# Patient Record
Sex: FEMALE | Race: BLACK OR AFRICAN AMERICAN | NOT HISPANIC OR LATINO | Employment: UNEMPLOYED | URBAN - METROPOLITAN AREA
[De-identification: names, ages, dates, MRNs, and addresses within clinical notes are randomized per-mention and may not be internally consistent; named-entity substitution may affect disease eponyms.]

---

## 2019-07-02 PROBLEM — E11.9 DIABETES MELLITUS WITHOUT COMPLICATION: Status: ACTIVE | Noted: 2019-07-02

## 2019-07-02 PROBLEM — H40.009 GLAUCOMA SUSPECT: Status: ACTIVE | Noted: 2019-07-02

## 2020-02-27 PROBLEM — D50.9 IRON DEFICIENCY ANEMIA: Status: ACTIVE | Noted: 2020-02-27

## 2020-02-27 PROBLEM — F41.9 ANXIETY: Status: ACTIVE | Noted: 2020-02-27

## 2020-02-27 PROBLEM — J30.89 ENVIRONMENTAL AND SEASONAL ALLERGIES: Status: ACTIVE | Noted: 2020-02-27

## 2020-02-27 PROBLEM — K21.9 GASTROESOPHAGEAL REFLUX DISEASE WITHOUT ESOPHAGITIS: Status: ACTIVE | Noted: 2020-02-27

## 2020-02-27 PROBLEM — Z72.0 TOBACCO USE: Status: ACTIVE | Noted: 2020-02-27

## 2020-02-27 PROBLEM — H53.8 BLURRY VISION, BILATERAL: Status: ACTIVE | Noted: 2020-02-27

## 2020-02-27 PROBLEM — G47.33 OSA (OBSTRUCTIVE SLEEP APNEA): Status: ACTIVE | Noted: 2020-02-27

## 2020-02-27 PROBLEM — N93.8 DUB (DYSFUNCTIONAL UTERINE BLEEDING): Status: ACTIVE | Noted: 2020-02-27

## 2020-02-27 PROBLEM — R73.09 ELEVATED HEMOGLOBIN A1C: Status: ACTIVE | Noted: 2020-02-27

## 2020-03-26 PROBLEM — S03.00XA TMJ (DISLOCATION OF TEMPOROMANDIBULAR JOINT): Status: ACTIVE | Noted: 2017-12-20

## 2020-03-26 PROBLEM — R73.03 PREDIABETES: Status: ACTIVE | Noted: 2019-04-29

## 2020-03-26 PROBLEM — D64.9 ANEMIA: Status: ACTIVE | Noted: 2018-06-14

## 2020-03-26 PROBLEM — K11.20 PAROTITIS: Status: ACTIVE | Noted: 2018-03-19

## 2020-03-26 PROBLEM — Z87.42 HX OF ABNORMAL CERVICAL PAP SMEAR: Status: ACTIVE | Noted: 2018-06-14

## 2020-03-26 PROBLEM — R22.0 LEFT FACIAL SWELLING: Status: ACTIVE | Noted: 2017-12-20

## 2021-03-01 PROBLEM — D56.3 ALPHA THALASSEMIA MINOR: Status: ACTIVE | Noted: 2021-03-01

## 2021-04-21 DIAGNOSIS — Z11.59 NEED FOR HEPATITIS C SCREENING TEST: ICD-10-CM

## 2021-04-21 DIAGNOSIS — E11.9 TYPE 2 DIABETES MELLITUS WITHOUT COMPLICATION: ICD-10-CM

## 2021-04-28 DIAGNOSIS — E11.9 DIABETES MELLITUS WITHOUT COMPLICATION: ICD-10-CM

## 2021-09-03 PROBLEM — E11.8 CONTROLLED TYPE 2 DIABETES MELLITUS WITH COMPLICATION, WITHOUT LONG-TERM CURRENT USE OF INSULIN: Status: ACTIVE | Noted: 2021-09-03

## 2022-09-29 PROBLEM — R13.10 ODYNOPHAGIA: Status: ACTIVE | Noted: 2022-09-29

## 2022-12-02 PROBLEM — F43.81 COMPLEX GRIEF DISORDER LASTING LONGER THAN 12 MONTHS: Status: ACTIVE | Noted: 2022-12-02

## 2022-12-02 PROBLEM — G47.00 INSOMNIA: Status: ACTIVE | Noted: 2022-12-02

## 2022-12-02 PROBLEM — F32.A DEPRESSION: Status: ACTIVE | Noted: 2022-12-02

## 2023-07-28 PROBLEM — Z72.0 TOBACCO USE: Status: RESOLVED | Noted: 2020-02-27 | Resolved: 2023-07-28

## 2023-08-10 PROBLEM — F32.9 MDD (MAJOR DEPRESSIVE DISORDER): Status: ACTIVE | Noted: 2023-08-10

## 2023-08-10 PROBLEM — F41.1 GAD (GENERALIZED ANXIETY DISORDER): Status: ACTIVE | Noted: 2023-08-10

## 2023-09-14 PROBLEM — F41.9 ANXIETY: Status: RESOLVED | Noted: 2020-02-27 | Resolved: 2023-09-14

## 2023-09-14 PROBLEM — F32.A DEPRESSION: Status: RESOLVED | Noted: 2022-12-02 | Resolved: 2023-09-14

## 2023-09-14 PROBLEM — F32.9 MDD (MAJOR DEPRESSIVE DISORDER): Status: ACTIVE | Noted: 2023-09-14

## 2023-09-14 PROBLEM — F32.9 MDD (MAJOR DEPRESSIVE DISORDER): Status: RESOLVED | Noted: 2023-08-10 | Resolved: 2023-09-14

## 2023-09-15 ENCOUNTER — PATIENT MESSAGE (OUTPATIENT)
Dept: ADMINISTRATIVE | Facility: OTHER | Age: 53
End: 2023-09-15

## 2023-09-15 ENCOUNTER — SOCIAL WORK (OUTPATIENT)
Dept: ADMINISTRATIVE | Facility: OTHER | Age: 53
End: 2023-09-15

## 2023-09-15 NOTE — PROGRESS NOTES
Sw received a consult to assist with counseling. Sw called and spoke to Patient ([phone]). Patient expressed interest in counseling. Sw emailed Patient through the [facility] a list of counselors. She was encouraged to contact one to schedule an appointment at her convenience.    [facility]   [phone]  Susanna Block, [facility]   [phone]  [facility], [facility]    [phone]  BREEZY Cotter, [facility]   [phone]  Neelima Yanes, [facility]    [phone]    Priti lAmonte, [facility]    [phone]

## 2024-03-15 PROBLEM — R73.03 PREDIABETES: Status: RESOLVED | Noted: 2019-04-29 | Resolved: 2024-03-15

## 2024-05-15 ENCOUNTER — SOCIAL WORK (OUTPATIENT)
Dept: ADMINISTRATIVE | Facility: OTHER | Age: 54
End: 2024-05-15

## 2024-05-15 NOTE — PROGRESS NOTES
Sw received a message Patient was inquiring about grief counseling. Sw called Patient ([phone]). The phone rang with no answer. Sw mailed Patient the contact information for some grief counselors. She was encouraged to contact one to schedule an assessment.    Priti Almonte LCSW    [phone]

## 2024-09-23 DIAGNOSIS — E11.9 TYPE 2 DIABETES MELLITUS WITHOUT COMPLICATION: ICD-10-CM

## 2024-09-25 ENCOUNTER — PATIENT MESSAGE (OUTPATIENT)
Dept: ADMINISTRATIVE | Facility: HOSPITAL | Age: 54
End: 2024-09-25

## 2024-12-19 ENCOUNTER — OUTPATIENT CASE MANAGEMENT (OUTPATIENT)
Dept: ADMINISTRATIVE | Facility: OTHER | Age: 54
End: 2024-12-19

## 2024-12-19 NOTE — PROGRESS NOTES
Outpatient Care Management  Patient Does Not Consent    Patient: Ana Harmon  MRN:  [id number]  Date of Service:  12/19/2024  Completed by:  Priti Almonte LCSW    Chief Complaint   Patient presents with    Other       Patient Summary         Consent Received:  Decline  Decline Reason:  Other (see comment)       Sw received a consult to assist with counseling services. Sw called and spoke to Patient. Patient confirmed she is interested in counseling. Patient denied having any other needs at this time. Sw faxed a referral to [facility] to review.

## 2024-12-30 ENCOUNTER — OUTPATIENT CASE MANAGEMENT (OUTPATIENT)
Dept: ADMINISTRATIVE | Facility: OTHER | Age: 54
End: 2024-12-30

## 2024-12-30 NOTE — PROGRESS NOTES
Sultana called [facility] to follow-up on the counseling referral. Staff report Patient has been to the Clinic and completed an assessment. Sultana verbalized appreciation.    [facility]  [address]  [phone]  Appt:  12/26/2024    Priti Almonte LCSW    [phone]